# Patient Record
Sex: FEMALE | Race: WHITE | NOT HISPANIC OR LATINO | Employment: FULL TIME | URBAN - METROPOLITAN AREA
[De-identification: names, ages, dates, MRNs, and addresses within clinical notes are randomized per-mention and may not be internally consistent; named-entity substitution may affect disease eponyms.]

---

## 2017-01-23 DIAGNOSIS — S39.012A LUMBAR STRAIN, INITIAL ENCOUNTER: Primary | ICD-10-CM

## 2017-01-23 RX ORDER — METHYLPREDNISOLONE 4 MG/1
TABLET ORAL
Qty: 21 TABLET | Refills: 0 | Status: SHIPPED | OUTPATIENT
Start: 2017-01-23 | End: 2018-10-24

## 2017-01-23 RX ORDER — CYCLOBENZAPRINE HCL 10 MG
10 TABLET ORAL 3 TIMES DAILY PRN
Qty: 30 TABLET | Refills: 0 | Status: SHIPPED | OUTPATIENT
Start: 2017-01-23

## 2017-08-18 ENCOUNTER — TELEPHONE (OUTPATIENT)
Dept: FAMILY MEDICINE CLINIC | Facility: CLINIC | Age: 42
End: 2017-08-18

## 2018-10-24 ENCOUNTER — OFFICE VISIT (OUTPATIENT)
Dept: FAMILY MEDICINE CLINIC | Facility: CLINIC | Age: 43
End: 2018-10-24

## 2018-10-24 VITALS
WEIGHT: 155 LBS | BODY MASS INDEX: 23.49 KG/M2 | HEART RATE: 62 BPM | HEIGHT: 68 IN | SYSTOLIC BLOOD PRESSURE: 106 MMHG | OXYGEN SATURATION: 97 % | RESPIRATION RATE: 16 BRPM | DIASTOLIC BLOOD PRESSURE: 62 MMHG

## 2018-10-24 DIAGNOSIS — J02.9 EXUDATIVE PHARYNGITIS: ICD-10-CM

## 2018-10-24 DIAGNOSIS — J02.0 STREP THROAT: Primary | ICD-10-CM

## 2018-10-24 DIAGNOSIS — R50.9 FEBRILE ILLNESS, ACUTE: ICD-10-CM

## 2018-10-24 DIAGNOSIS — M54.5 LOW BACK PAIN, UNSPECIFIED BACK PAIN LATERALITY, UNSPECIFIED CHRONICITY, WITH SCIATICA PRESENCE UNSPECIFIED: ICD-10-CM

## 2018-10-24 LAB
BILIRUB BLD-MCNC: NEGATIVE MG/DL
CLARITY, POC: CLEAR
COLOR UR: YELLOW
GLUCOSE UR STRIP-MCNC: NEGATIVE MG/DL
KETONES UR QL: ABNORMAL
LEUKOCYTE EST, POC: NEGATIVE
NITRITE UR-MCNC: NEGATIVE MG/ML
PH UR: 6 [PH] (ref 5–8)
PROT UR STRIP-MCNC: ABNORMAL MG/DL
RBC # UR STRIP: ABNORMAL /UL
SP GR UR: 1.01 (ref 1–1.03)
UROBILINOGEN UR QL: NORMAL

## 2018-10-24 PROCEDURE — 96372 THER/PROPH/DIAG INJ SC/IM: CPT | Performed by: NURSE PRACTITIONER

## 2018-10-24 PROCEDURE — 81002 URINALYSIS NONAUTO W/O SCOPE: CPT | Performed by: NURSE PRACTITIONER

## 2018-10-24 PROCEDURE — 99213 OFFICE O/P EST LOW 20 MIN: CPT | Performed by: NURSE PRACTITIONER

## 2018-10-24 RX ORDER — AZITHROMYCIN 250 MG/1
TABLET, FILM COATED ORAL
Qty: 6 TABLET | Refills: 1 | Status: SHIPPED | OUTPATIENT
Start: 2018-10-24

## 2018-10-24 RX ORDER — METHYLPREDNISOLONE ACETATE 80 MG/ML
80 INJECTION, SUSPENSION INTRA-ARTICULAR; INTRALESIONAL; INTRAMUSCULAR; SOFT TISSUE ONCE
Status: SHIPPED | OUTPATIENT
Start: 2018-10-24

## 2018-10-24 RX ORDER — METHYLPREDNISOLONE ACETATE 40 MG/ML
80 INJECTION, SUSPENSION INTRA-ARTICULAR; INTRALESIONAL; INTRAMUSCULAR; SOFT TISSUE ONCE
Status: COMPLETED | OUTPATIENT
Start: 2018-10-24 | End: 2018-10-24

## 2018-10-24 RX ADMIN — METHYLPREDNISOLONE ACETATE 80 MG: 40 INJECTION, SUSPENSION INTRA-ARTICULAR; INTRALESIONAL; INTRAMUSCULAR; SOFT TISSUE at 15:51

## 2018-10-25 LAB
BASOPHILS # BLD AUTO: 0 X10E3/UL (ref 0–0.2)
BASOPHILS NFR BLD AUTO: 0 %
EBV EA IGG SER-ACNC: 37.6 U/ML (ref 0–8.9)
EBV NA IGG SER IA-ACNC: 294 U/ML (ref 0–17.9)
EBV VCA IGG SER IA-ACNC: 349 U/ML (ref 0–17.9)
EBV VCA IGM SER IA-ACNC: 42.5 U/ML (ref 0–35.9)
EOSINOPHIL # BLD AUTO: 0.1 X10E3/UL (ref 0–0.4)
EOSINOPHIL NFR BLD AUTO: 1 %
ERYTHROCYTE [DISTWIDTH] IN BLOOD BY AUTOMATED COUNT: 13.4 % (ref 12.3–15.4)
HCT VFR BLD AUTO: 38.2 % (ref 34–46.6)
HGB BLD-MCNC: 12.6 G/DL (ref 11.1–15.9)
IMM GRANULOCYTES # BLD: 0 X10E3/UL (ref 0–0.1)
IMM GRANULOCYTES NFR BLD: 0 %
LYMPHOCYTES # BLD AUTO: 1.7 X10E3/UL (ref 0.7–3.1)
LYMPHOCYTES NFR BLD AUTO: 25 %
MCH RBC QN AUTO: 30.1 PG (ref 26.6–33)
MCHC RBC AUTO-ENTMCNC: 33 G/DL (ref 31.5–35.7)
MCV RBC AUTO: 91 FL (ref 79–97)
MONOCYTES # BLD AUTO: 1.1 X10E3/UL (ref 0.1–0.9)
MONOCYTES NFR BLD AUTO: 16 %
NEUTROPHILS # BLD AUTO: 3.9 X10E3/UL (ref 1.4–7)
NEUTROPHILS NFR BLD AUTO: 58 %
PLATELET # BLD AUTO: 226 X10E3/UL (ref 150–379)
RBC # BLD AUTO: 4.19 X10E6/UL (ref 3.77–5.28)
SERVICE CMNT-IMP: ABNORMAL
WBC # BLD AUTO: 6.9 X10E3/UL (ref 3.4–10.8)

## 2018-10-25 NOTE — PATIENT INSTRUCTIONS
Strep Throat  Strep throat is a bacterial infection of the throat. Your health care provider may call the infection tonsillitis or pharyngitis, depending on whether there is swelling in the tonsils or at the back of the throat. Strep throat is most common during the cold months of the year in children who are 5-15 years of age, but it can happen during any season in people of any age. This infection is spread from person to person (contagious) through coughing, sneezing, or close contact.  What are the causes?  Strep throat is caused by the bacteria called Streptococcus pyogenes.  What increases the risk?  This condition is more likely to develop in:  · People who spend time in crowded places where the infection can spread easily.  · People who have close contact with someone who has strep throat.    What are the signs or symptoms?  Symptoms of this condition include:  · Fever or chills.  · Redness, swelling, or pain in the tonsils or throat.  · Pain or difficulty when swallowing.  · White or yellow spots on the tonsils or throat.  · Swollen, tender glands in the neck or under the jaw.  · Red rash all over the body (rare).    How is this diagnosed?  This condition is diagnosed by performing a rapid strep test or by taking a swab of your throat (throat culture test). Results from a rapid strep test are usually ready in a few minutes, but throat culture test results are available after one or two days.  How is this treated?  This condition is treated with antibiotic medicine.  Follow these instructions at home:  Medicines  · Take over-the-counter and prescription medicines only as told by your health care provider.  · Take your antibiotic as told by your health care provider. Do not stop taking the antibiotic even if you start to feel better.  · Have family members who also have a sore throat or fever tested for strep throat. They may need antibiotics if they have the strep infection.  Eating and drinking  · Do not  share food, drinking cups, or personal items that could cause the infection to spread to other people.  · If swallowing is difficult, try eating soft foods until your sore throat feels better.  · Drink enough fluid to keep your urine clear or pale yellow.  General instructions  · Gargle with a salt-water mixture 3-4 times per day or as needed. To make a salt-water mixture, completely dissolve ½-1 tsp of salt in 1 cup of warm water.  · Make sure that all household members wash their hands well.  · Get plenty of rest.  · Stay home from school or work until you have been taking antibiotics for 24 hours.  · Keep all follow-up visits as told by your health care provider. This is important.  Contact a health care provider if:  · The glands in your neck continue to get bigger.  · You develop a rash, cough, or earache.  · You cough up a thick liquid that is green, yellow-brown, or bloody.  · You have pain or discomfort that does not get better with medicine.  · Your problems seem to be getting worse rather than better.  · You have a fever.  Get help right away if:  · You have new symptoms, such as vomiting, severe headache, stiff or painful neck, chest pain, or shortness of breath.  · You have severe throat pain, drooling, or changes in your voice.  · You have swelling of the neck, or the skin on the neck becomes red and tender.  · You have signs of dehydration, such as fatigue, dry mouth, and decreased urination.  · You become increasingly sleepy, or you cannot wake up completely.  · Your joints become red or painful.  This information is not intended to replace advice given to you by your health care provider. Make sure you discuss any questions you have with your health care provider.  Document Released: 12/15/2001 Document Revised: 08/16/2017 Document Reviewed: 04/11/2016  [employer] Patient Education © 2018 [employer]

## 2018-10-25 NOTE — PROGRESS NOTES
Chief Complaint   Patient presents with   • Sore Throat       History of Present Illness  HPI: Nina Patricio is a 43 y.o. female present today for sore throat, fever and positive strep screen 4 days ago at [facility]. She has valery on 11ml of amoxil liquid BID and is still having fevers and swollen glands. Pt is having low back pain and body aches. Denies headache, abdominal pain or nausea. Denies history of mono in the past. Denies pain with urination.   Sore Throat: Patient complains of sore throat. Associated symptoms include fevers up to 102 degrees, chills, enlarged tonsils, headache, sore throat, swollen glands and white spots in throat.Onset of symptoms was 4 days ago, unchanged since that time. She is drinking plenty of fluids. She has had recent close exposure to someone with proven streptococcal pharyngitis.    Review of Systems   Constitutional: Positive for fatigue and fever.   HENT: Positive for sore throat. Negative for trouble swallowing and voice change.        There is no problem list on file for this patient.    No past medical history on file.  Social History     Social History   • Marital status:      Spouse name: N/A   • Number of children: N/A   • Years of education: N/A     Occupational History   • Not on file.     Social History Main Topics   • Smoking status: Not on file   • Smokeless tobacco: Not on file   • Alcohol use Not on file   • Drug use: Unknown   • Sexual activity: Not on file     Other Topics Concern   • Not on file     Social History Narrative   • No narrative on file     No past surgical history on file.    Current Outpatient Prescriptions:   •  azithromycin (ZITHROMAX Z-AUDIE) 250 MG tablet, Take 2 tablets the first day, then 1 tablet daily for 4 days., Disp: 6 tablet, Rfl: 1  •  cyclobenzaprine (FLEXERIL) 10 MG tablet, Take 1 tablet by mouth 3 (Three) Times a Day As Needed for muscle spasms., Disp: 30 tablet, Rfl: 0    Current Facility-Administered Medications:   •   "methylPREDNISolone acetate (DEPO-medrol) injection 80 mg, 80 mg, Intramuscular, Once, Roxanne Canas, TERESO  Allergies   Allergen Reactions   • Codeine Mental Status Change     Vitals:    10/24/18 1602   BP: 106/62   Pulse: 62   Resp: 16   SpO2: 97%   Weight: 70.3 kg (155 lb)   Height: 172.7 cm (68\")       Physical Exam   Constitutional: She is oriented to person, place, and time. She appears well-developed and well-nourished.   HENT:   Head: Normocephalic and atraumatic.   Right Ear: External ear normal.   Left Ear: External ear normal.   Nose: Nose normal.   Mouth/Throat: Uvula is midline. No trismus in the jaw. Oropharyngeal exudate present. No tonsillar abscesses. Tonsils are 3+ on the right. Tonsils are 3+ on the left. Tonsillar exudate.   Eyes: Pupils are equal, round, and reactive to light. Conjunctivae and EOM are normal.   Neck: Normal range of motion.   Cardiovascular: Normal rate.    Pulmonary/Chest: Effort normal and breath sounds normal.   Lymphadenopathy:     She has cervical adenopathy.   Neurological: She is alert and oriented to person, place, and time.   Skin: Skin is warm and dry.   Psychiatric: She has a normal mood and affect. Her behavior is normal. Judgment and thought content normal.   Nursing note and vitals reviewed.  no CVA pain      Assessment  Encounter Diagnoses   Name Primary?   • Strep throat Yes   • Febrile illness, acute    • Exudative pharyngitis    • Low back pain, unspecified back pain laterality, unspecified chronicity, with sciatica presence unspecified          Discussion/Summary      End of Encounter Orders  Orders Placed This Encounter   Procedures   • Beta Strep Culture, Throat - , Throat   • Urine Culture - Urine, Urine, Clean Catch   • Saulo-Barr Virus VCA Antibody Panel   • Ambulatory Referral to ENT (Otolaryngology)     Referral Priority:   Urgent     Referral Type:   Consultation     Referral Reason:   Specialty Services Required     Referred to Provider:   Keyonna" Kenan June MD     Requested Specialty:   Otolaryngology     Number of Visits Requested:   1   • POCT urinalysis dipstick, manual   • CBC & Differential     Order Specific Question:   Manual Differential     Answer:   No   • CBC & Differential       Follow Up  Patient was told to schedule follow up in 2day(s)        TERESO [employer]

## 2018-10-26 LAB
BACTERIA UR CULT: NO GROWTH
BACTERIA UR CULT: NORMAL

## 2018-10-27 LAB — S PYO THROAT QL CULT: ABNORMAL

## 2018-10-31 DIAGNOSIS — T36.95XA ANTIBIOTIC REACTION: Primary | ICD-10-CM

## 2018-10-31 RX ORDER — FLUCONAZOLE 150 MG/1
150 TABLET ORAL ONCE
Qty: 1 TABLET | Refills: 0 | Status: SHIPPED | OUTPATIENT
Start: 2018-10-31 | End: 2018-10-31

## 2021-03-18 ENCOUNTER — IMMUNIZATION (OUTPATIENT)
Dept: VACCINE CLINIC | Facility: HOSPITAL | Age: 46
End: 2021-03-18

## 2021-03-18 PROCEDURE — 0001A: CPT | Performed by: INTERNAL MEDICINE

## 2021-03-18 PROCEDURE — 91300 HC SARSCOV02 VAC 30MCG/0.3ML IM: CPT | Performed by: INTERNAL MEDICINE

## 2021-04-08 ENCOUNTER — IMMUNIZATION (OUTPATIENT)
Dept: VACCINE CLINIC | Facility: HOSPITAL | Age: 46
End: 2021-04-08

## 2021-04-08 PROCEDURE — 91300 HC SARSCOV02 VAC 30MCG/0.3ML IM: CPT | Performed by: INTERNAL MEDICINE

## 2021-04-08 PROCEDURE — 0002A: CPT | Performed by: INTERNAL MEDICINE

## 2022-06-24 ENCOUNTER — TELEPHONE (OUTPATIENT)
Dept: GASTROENTEROLOGY | Facility: CLINIC | Age: 47
End: 2022-06-24

## 2022-06-24 NOTE — TELEPHONE ENCOUNTER
Received referral through fax.   Scanned into media.  Please contact patient to complete questionnaire.

## 2022-06-28 ENCOUNTER — PRE-PROCEDURE SCREENING (OUTPATIENT)
Dept: GASTROENTEROLOGY | Facility: CLINIC | Age: 47
End: 2022-06-28

## 2023-01-03 ENCOUNTER — TELEPHONE (OUTPATIENT)
Dept: GASTROENTEROLOGY | Facility: CLINIC | Age: 48
End: 2023-01-03

## 2023-01-03 NOTE — TELEPHONE ENCOUNTER
Caller: Nina Patricio    Relationship to patient: Self    Best call back number: [phone]    Chief complaint: PATIENT WISHES TO SCHEDULE COLONOSCOPY    Type of visit: SCOPE    Requested date: NO PREFERENCE FOR DAY OF WEEK OR TIME OF DAY

## 2023-01-11 DIAGNOSIS — Z12.11 COLON CANCER SCREENING: Primary | ICD-10-CM

## 2023-01-11 RX ORDER — SODIUM CHLORIDE, SODIUM LACTATE, POTASSIUM CHLORIDE, CALCIUM CHLORIDE 600; 310; 30; 20 MG/100ML; MG/100ML; MG/100ML; MG/100ML
30 INJECTION, SOLUTION INTRAVENOUS CONTINUOUS
Status: CANCELLED | OUTPATIENT
Start: 2023-06-23

## 2023-01-31 NOTE — TELEPHONE ENCOUNTER
Caller: Nina Patricio    Relationship to patient: Self    Best call back number: [phone]    Chief complaint: RETURNING VM TO SCHEDULE COLONOSCOPY    Type of visit: GASTRO PROCEDURE- COLONOSCOPY    Additional notes: PATIENT RETURNING VM TO SCHEDULE COLONOSCOPY. I WAS UNABLE TO WARM TRANSFER/ NO ANSWER. PLEASE REVIEW AND CONTACT PATIENT TO SCHEDULE.

## 2023-02-10 ENCOUNTER — TELEPHONE (OUTPATIENT)
Dept: GASTROENTEROLOGY | Facility: CLINIC | Age: 48
End: 2023-02-10
Payer: COMMERCIAL

## 2023-02-15 ENCOUNTER — TELEPHONE (OUTPATIENT)
Dept: GASTROENTEROLOGY | Facility: CLINIC | Age: 48
End: 2023-02-15

## 2023-02-15 NOTE — TELEPHONE ENCOUNTER
Caller: Nina Patricio    Relationship to patient: Self    Best call back number: [phone]    Patient is needing: PATIENT IS RETURNING A CALL TO SCHEDULE HER PROCEDURE. 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 SHE WOULD LIKE YOU TO GO AHEAD AND SCHEDULE HER PROCEDURE AND CALL AND LEAVE HER A VOICEMAIL WITH THE APPOINTMENT TIME AND DETAILS PLEASE.

## 2023-03-16 ENCOUNTER — TELEPHONE (OUTPATIENT)
Dept: GASTROENTEROLOGY | Facility: CLINIC | Age: 48
End: 2023-03-16

## 2023-03-16 NOTE — TELEPHONE ENCOUNTER
Hub staff attempted to follow warm transfer process and was unsuccessful     Caller: Nina Patricio    Relationship to patient: Self    Best call back number: [phone]  Patient is needing: PATIENT CALLED IN REGARDS TO SCHEDULING COLONOSCOPY STATES IT BEEN A MONTH SINCE SHE LAST CALLED IN TO TRY AND SCHEDULE WITH NO CALL BACK  PLEASE CALL PATIENT BACK TO SCHEDULE COLONOSCOPY

## 2023-03-21 NOTE — TELEPHONE ENCOUNTER
Caller: Nina Patricio    Relationship to patient: Self    Best call back number: [phone]    Patient is needing: PATIENT IS CALLING TO SCHEDULE COLONOSCOPY. SHE STATES THAT DUE TO HER JOB SHE MAY NOT BE ABLE TO ANSWER THE PHONE, SHE STATES THAT THE PROCEDURE CAN BE SCHEDULED AND DETAILS CAN LEFT ON HER VM OR SHE CAN COMMUNICATE WITH THE OFFICE VIA [facility] IF POSSIBLE.

## 2023-03-22 ENCOUNTER — TELEPHONE (OUTPATIENT)
Dept: GASTROENTEROLOGY | Facility: CLINIC | Age: 48
End: 2023-03-22
Payer: COMMERCIAL

## 2023-03-22 NOTE — TELEPHONE ENCOUNTER
----- Message from Nina Patricio sent at 3/21/2023  9:30 PM EDT -----  Regarding: Nobody has scheduled my colonoscopy   Contact: [phone]  Good evening. I have tried for a month to get a colonoscopy scheduled and have had no success.  Please schedule this appointment through my chart since i have had no luck trying over the phone.

## 2023-03-27 PROBLEM — Z12.11 COLON CANCER SCREENING: Status: ACTIVE | Noted: 2023-03-27

## 2023-06-19 ENCOUNTER — TELEPHONE (OUTPATIENT)
Dept: GASTROENTEROLOGY | Facility: CLINIC | Age: 48
End: 2023-06-19
Payer: COMMERCIAL

## 2024-07-26 ENCOUNTER — APPOINTMENT (OUTPATIENT)
Dept: WOMENS IMAGING | Facility: HOSPITAL | Age: 49
End: 2024-07-26
Payer: COMMERCIAL

## 2024-07-26 PROCEDURE — 77063 BREAST TOMOSYNTHESIS BI: CPT | Performed by: RADIOLOGY

## 2024-07-26 PROCEDURE — 77067 SCR MAMMO BI INCL CAD: CPT | Performed by: RADIOLOGY
